# Patient Record
Sex: MALE | Race: BLACK OR AFRICAN AMERICAN | NOT HISPANIC OR LATINO | RURAL
[De-identification: names, ages, dates, MRNs, and addresses within clinical notes are randomized per-mention and may not be internally consistent; named-entity substitution may affect disease eponyms.]

---
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
## 2022-01-20 ENCOUNTER — OFFICE VISIT (OUTPATIENT)
Dept: CARDIOLOGY | Facility: CLINIC | Age: 22
End: 2022-01-20

## 2022-01-20 VITALS
OXYGEN SATURATION: 99 % | BODY MASS INDEX: 41.75 KG/M2 | HEART RATE: 95 BPM | RESPIRATION RATE: 14 BRPM | WEIGHT: 315 LBS | SYSTOLIC BLOOD PRESSURE: 154 MMHG | HEIGHT: 73 IN | DIASTOLIC BLOOD PRESSURE: 82 MMHG

## 2022-01-20 DIAGNOSIS — I10 PRIMARY HYPERTENSION: Primary | ICD-10-CM

## 2022-01-20 DIAGNOSIS — G47.10 HYPERSOMNIA: ICD-10-CM

## 2022-01-20 DIAGNOSIS — E66.2 CLASS 3 OBESITY WITH ALVEOLAR HYPOVENTILATION WITHOUT SERIOUS COMORBIDITY WITH BODY MASS INDEX (BMI) OF 45.0 TO 49.9 IN ADULT: ICD-10-CM

## 2022-01-20 PROCEDURE — 99214 OFFICE O/P EST MOD 30 MIN: CPT | Performed by: INTERNAL MEDICINE

## 2022-01-20 RX ORDER — AMLODIPINE BESYLATE 5 MG/1
5 TABLET ORAL DAILY
Qty: 30 TABLET | Refills: 11 | Status: SHIPPED | OUTPATIENT
Start: 2022-01-20 | End: 2022-01-21 | Stop reason: SDUPTHER

## 2022-01-20 NOTE — PROGRESS NOTES
[facility]      Patient Name: Terence Looney  :2000  Age: 21 y.o.  Encounter Provider:  Aguila Treviño Jr, MD      Chief Complaint:   Chief Complaint   Patient presents with   • Hypertension         Hypertension  Pertinent negatives include no chest pain, orthopnea, palpitations or PND.     Terence Looney is a 21 y.o. male past medical history of obesity and ADHD who presents for follow-up evaluation of high blood pressure.     Last clinic visit note: patient has been noted to have elevated blood pressure on his last few PCP visits.  He has a longstanding history of Adderall use for ADHD.  Is a very active gentleman who continues to play sports a few times per week.  He denies any chest pain or shortness of air.  No orthopnea, PND or edema.  He denies palpitations, dizziness or syncope.  He is unaware of family history as he was adopted.  He is a lifelong non-smoker who denies alcohol or illicit drug use.  He does admit to snoring and hypersomnia.  He does not check blood pressure at home.    He did not send his blood pressure log after last visit.  He brings in a blood pressure log which shows he is poorly controlled.  He denies chest pain or shortness of air.  He has been going to the gym and working out playing basketball as much as possible.  He does admit to snoring or hypersomnia.  No cardiac complaints at time of interview.    The following portions of the patient's history were reviewed and updated as appropriate: allergies, current medications, past family history, past medical history, past social history, past surgical history and problem list.      Review of Systems   Constitutional: Negative for chills and fever.   HENT: Negative for hoarse voice and sore throat.    Eyes: Negative for double vision and photophobia.   Cardiovascular: Negative for chest pain, leg swelling, near-syncope, orthopnea, palpitations, paroxysmal nocturnal dyspnea and syncope.   Respiratory: Negative  "for cough and wheezing.    Skin: Negative for poor wound healing and rash.   Musculoskeletal: Negative for arthritis and joint swelling.   Gastrointestinal: Negative for bloating, abdominal pain, hematemesis and hematochezia.   Neurological: Negative for dizziness and focal weakness.   Psychiatric/Behavioral: Negative for depression and suicidal ideas.   ROS was reviewed, updated and amended when necessary.      OBJECTIVE:   Vital Signs  Vitals:    01/20/22 1336   BP: 154/82   Pulse: 95   Resp: 14   SpO2: 99%     Estimated body mass index is 46.5 kg/m² as calculated from the following:    Height as of this encounter: 184.2 cm (72.5\").    Weight as of this encounter: 158 kg (347 lb 9.6 oz).    Vitals reviewed.   Constitutional:       Appearance: Healthy appearance. Not in distress.   Neck:      Vascular: No JVR. JVD normal.   Pulmonary:      Effort: Pulmonary effort is normal.      Breath sounds: Normal breath sounds. No wheezing. No rhonchi. No rales.   Chest:      Chest wall: Not tender to palpatation.   Cardiovascular:      PMI at left midclavicular line. Normal rate. Regular rhythm. Normal S1. Normal S2.      Murmurs: There is no murmur.      No gallop. No click. No rub.   Pulses:     Intact distal pulses.   Edema:     Peripheral edema absent.   Abdominal:      General: Bowel sounds are normal.      Palpations: Abdomen is soft.      Tenderness: There is no abdominal tenderness.   Musculoskeletal: Normal range of motion.         General: No tenderness. Skin:     General: Skin is warm and dry.   Neurological:      General: No focal deficit present.      Mental Status: Alert and oriented to person, place and time.       Physical exam was reviewed, updated and amended when necessary.  Procedures          ASSESSMENT:     Elevated blood pressure without diagnosis of hypertension  ADHD  Obesity  Hypersomnia    PLAN OF CARE:     1. Hypertension -we will check an echocardiogram.  Start amlodipine 5 mg.  Twice daily blood " pressure log to be sent in after 2 weeks.  He will discuss sleep study with his primary care physician Dr. Hays.  2. ADHD  3. Hypersomnia  4. Obesity    Return to clinic 3 months           Discharge Medications          Accurate as of January 20, 2022  1:51 PM. If you have any questions, ask your nurse or doctor.            Continue These Medications      Instructions Start Date   amphetamine-dextroamphetamine 20 MG tablet  Commonly known as: ADDERALL   20 mg, Oral, As Needed      cetirizine 10 MG tablet  Commonly known as: zyrTEC   10 mg, Oral, Daily      EPIPEN IJ   Injection      fluticasone 50 MCG/ACT nasal spray  Commonly known as: FLONASE   2 sprays, Nasal, Daily      VITAMIN C PO   Oral, Daily      VITAMIN D-3 PO   Oral, Daily      ZINC PO   Oral, Daily             Thank you for allowing me to participate in the care of your patient,      Sincerely,   Aguila Treviño MD  [facility]  01/20/22  13:51 EST

## 2022-01-21 RX ORDER — AMLODIPINE BESYLATE 5 MG/1
5 TABLET ORAL DAILY
Qty: 90 TABLET | Refills: 3 | Status: SHIPPED | OUTPATIENT
Start: 2022-01-21